# Patient Record
Sex: FEMALE | Race: WHITE | NOT HISPANIC OR LATINO | URBAN - NONMETROPOLITAN AREA
[De-identification: names, ages, dates, MRNs, and addresses within clinical notes are randomized per-mention and may not be internally consistent; named-entity substitution may affect disease eponyms.]

---

## 2021-06-28 ENCOUNTER — IMPORTED ENCOUNTER (OUTPATIENT)
Dept: URBAN - NONMETROPOLITAN AREA CLINIC 1 | Facility: CLINIC | Age: 66
End: 2021-06-28

## 2021-06-28 PROBLEM — H35.3132: Noted: 2021-06-28

## 2021-06-28 PROBLEM — H16.223: Noted: 2021-06-28

## 2021-06-28 PROBLEM — H53.2: Noted: 2021-06-28

## 2021-06-28 PROBLEM — H52.4: Noted: 2021-06-28

## 2021-06-28 PROBLEM — H25.813: Noted: 2021-06-28

## 2021-06-28 PROCEDURE — 99204 OFFICE O/P NEW MOD 45 MIN: CPT

## 2021-06-28 PROCEDURE — 92134 CPTRZ OPH DX IMG PST SGM RTA: CPT

## 2021-06-28 PROCEDURE — 92015 DETERMINE REFRACTIVE STATE: CPT

## 2021-06-28 NOTE — PATIENT DISCUSSION
AMD - dry-Explained dry AMD and advised that there are currently no treatments available. -Recommend pt continue ocuvite and monitoring Amsler grid.-Amsler grid given and explained to pt. Recommend monitoring daily. Pt is to contact our office if any changes are noted. -Order OCT MAC today performed and reviwed w/pt Cataract(s)-Visually significant.-Cataract(s) causing symptomatic impairment of visual function not correctable with a tolerable change in glasses or contact lenses lighting or non-operative means resulting in specific activity limitations and/or participation restrictions including but not limited to reading viewing television driving or meeting vocational or recreational needs. -Expectation is clearer vision and reduced glare disability after cataract removal.-discussed this may improve the double vision-Refer to Dr Luis Angel Blount for cataract evaluationKsicca Discussed w/pt START Restasis bid OU written Rx given Continue to monitorPresbyopia/Diplopia -Discussed diagnosis with patient. Updated spec Rx given.; Dr's Notes: PCP: Dr. Nate Allan at [facility]

## 2021-07-30 ENCOUNTER — IMPORTED ENCOUNTER (OUTPATIENT)
Dept: URBAN - NONMETROPOLITAN AREA CLINIC 1 | Facility: CLINIC | Age: 66
End: 2021-07-30

## 2021-07-30 PROBLEM — H35.3132: Noted: 2021-07-30

## 2021-07-30 PROBLEM — H25.813: Noted: 2021-07-30

## 2021-07-30 PROCEDURE — 92014 COMPRE OPH EXAM EST PT 1/>: CPT

## 2021-07-30 NOTE — PATIENT DISCUSSION
Cataract(s)-Visually significant cataract OU .-Cataract(s) causing symptomatic impairment of visual function not correctable with a tolerable change in glasses or contact lenses lighting or non-operative means resulting in specific activity limitations and/or participation restrictions including but not limited to reading viewing television driving or meeting vocational or recreational needs. -Expectation is clearer vision and functional improvement in symptoms as well as reduced glare disability after cataract removal.-Order IOLMaster and OPD today. -Recommend Stand/Terad  based on today's OPD testing and lifestyle questionnaire.-All questions were answered regarding surgery including pre and post-op medications appointments activity restrictions and anesthetic usage.-The risks benefits and alternatives and special risk factors for the patient were discussed in detail including but not limited to: bleeding infection retinal detachment vitreous loss problems with the implant and possible need for additional surgery.-Although rare the possibility of complete vision loss was discussed.-The possible need for glasses post-operatively was discussed.-Order medical clearance exam based on history of high cholesterol -Patient elects to proceed with cataract surgery OD . Will schedule at patient's convenience and re-evaluate OS  in the future. Discussed lens and she elects Stand/Trad OU discussed need for bifocals.  Post opinflammation anticipated discussed dextenza insertion after surgery.; Dr's Notes: PCP: Dr. Gilbert Fine at [facility]

## 2021-09-13 PROBLEM — H25.813: Noted: 2021-09-13

## 2021-09-13 PROBLEM — H35.3132: Noted: 2021-09-13

## 2021-09-15 ENCOUNTER — IMPORTED ENCOUNTER (OUTPATIENT)
Dept: URBAN - NONMETROPOLITAN AREA CLINIC 1 | Facility: CLINIC | Age: 66
End: 2021-09-15

## 2021-09-15 NOTE — PATIENT DISCUSSION
Medical Clearance-Medical clearance done today. -No outstanding concerns that would preclude surgery.-Patient is cleared to proceed with scheduled surgery.; Dr's Notes: PCP: Dr. Sarah East at [facility]

## 2021-09-16 PROBLEM — J43.9: Noted: 2021-09-16

## 2021-09-16 PROBLEM — M13.80: Noted: 2021-09-16

## 2021-09-16 PROBLEM — R06.02: Noted: 2021-09-16

## 2021-09-16 PROBLEM — E78.5: Noted: 2021-09-16

## 2021-09-16 PROBLEM — Z01.818: Noted: 2021-09-16

## 2021-09-16 PROBLEM — F41.9: Noted: 2021-09-16

## 2021-09-16 PROBLEM — K21.9: Noted: 2021-09-16

## 2021-10-01 ENCOUNTER — IMPORTED ENCOUNTER (OUTPATIENT)
Dept: URBAN - NONMETROPOLITAN AREA CLINIC 1 | Facility: CLINIC | Age: 66
End: 2021-10-01

## 2021-10-01 PROBLEM — F41.9: Noted: 2021-10-01

## 2021-10-01 PROBLEM — H35.3132: Noted: 2021-10-01

## 2021-10-01 PROBLEM — K21.9: Noted: 2021-10-01

## 2021-10-01 PROBLEM — H25.812: Noted: 2021-10-01

## 2021-10-01 PROBLEM — Z98.41: Noted: 2021-10-01

## 2021-10-01 PROBLEM — M13.80: Noted: 2021-10-01

## 2021-10-01 PROCEDURE — 99024 POSTOP FOLLOW-UP VISIT: CPT

## 2021-10-01 NOTE — PATIENT DISCUSSION
s/p PCIOL-Pt doing well s/p PCIOL. -Continue post-op gtts according to instruction sheet and sleep with eye shield over eye for 7 nights.-Avoid bending at the waist lifting anything over 5lbs and dirty or chandni environments. -RTC 1 week POVCataract OS-Re-eval with Dr. Ike Woodall on 10/4/21AMD-Will need further evaluation after surgery including MAC OCT vitamins and FORESEEHOME.; Dr's Notes: PCP: Dr. Breezy Cannon at [facility]

## 2021-10-04 ENCOUNTER — IMPORTED ENCOUNTER (OUTPATIENT)
Dept: URBAN - NONMETROPOLITAN AREA CLINIC 1 | Facility: CLINIC | Age: 66
End: 2021-10-04

## 2021-10-04 PROBLEM — H25.812: Noted: 2021-10-01

## 2021-10-04 PROBLEM — F41.9: Noted: 2021-10-01

## 2021-10-04 PROBLEM — H35.3132: Noted: 2021-10-01

## 2021-10-04 PROBLEM — Z01.818: Noted: 2021-10-04

## 2021-10-04 PROBLEM — M13.80: Noted: 2021-10-01

## 2021-10-04 PROBLEM — K21.9: Noted: 2021-10-01

## 2021-10-04 PROCEDURE — 99024 POSTOP FOLLOW-UP VISIT: CPT

## 2021-10-04 NOTE — PATIENT DISCUSSION
Medical Clearance-Medical clearance done today. -No outstanding concerns that would preclude surgery.-Patient is cleared to proceed with scheduled surgery.; Dr's Notes: PCP: Dr. Maddy Arreguin at [facility]

## 2021-10-04 NOTE — PATIENT DISCUSSION
Cataract(s)-Visually significant cataract OS . -Cataract(s) causing symptomatic impairment of visual function not correctable with a tolerable change in glasses or contact lenses lighting or non-operative means resulting in specific activity limitations and/or participation restrictions including but not limited to reading viewing television driving or meeting vocational or recreational needs. -Expectation is clearer vision and functional improvement in symptoms as well as reduced glare disability after cataract removal.-Recommend Stand/trad based on previous OPD testing and lifestyle questionnaire.-All questions were answered regarding surgery including pre and post-op medications appointments activity restrictions and anesthetic usage.-The risks benefits and alternatives and special risk factors for the patient were discussed in detail including but not limited to: bleeding infection retinal detachment vitreous loss problems with the implant and possible need for additional surgery.-Although rare the possibility of complete vision loss was discussed.-The need for glasses post-operatively was discussed.-Patient elects to proceed with cataract surgery OS . Instructed pt to use inhaler before surgerys/p PCIOL-Pt doing well at 1 week s/p PCIOL. -Continue post-op gtts according to instruction sheet.-Okay to resume usual activites and d/c eye shield.; Dr's Notes: PCP: Dr. Senait Sibley at [facility]

## 2021-10-12 ENCOUNTER — IMPORTED ENCOUNTER (OUTPATIENT)
Dept: URBAN - NONMETROPOLITAN AREA CLINIC 1 | Facility: CLINIC | Age: 66
End: 2021-10-12

## 2021-10-12 PROBLEM — Z98.41: Noted: 2021-10-12

## 2021-10-12 PROBLEM — Z98.42: Noted: 2021-10-12

## 2021-10-12 PROBLEM — H35.3132: Noted: 2021-10-01

## 2021-10-12 PROCEDURE — 99024 POSTOP FOLLOW-UP VISIT: CPT

## 2021-10-12 NOTE — PATIENT DISCUSSION
s/p PCIOL Stand/trad OS 10/11/21-Pt doing well s/p PCIOL. -Continue post-op gtts according to instruction sheet and sleep with eye shield over eye for 7 nights.-Avoid bending at the waist lifting anything over 5lbs and dirty or chandni environments.; Dr's Notes: PCP: Dr. Dixie Kaba at [facility]

## 2021-10-26 ENCOUNTER — IMPORTED ENCOUNTER (OUTPATIENT)
Dept: URBAN - NONMETROPOLITAN AREA CLINIC 1 | Facility: CLINIC | Age: 66
End: 2021-10-26

## 2021-10-26 PROBLEM — Z98.42: Noted: 2021-10-12

## 2021-10-26 PROBLEM — H16.223: Noted: 2021-10-26

## 2021-10-26 PROBLEM — Z98.41: Noted: 2021-10-12

## 2021-10-26 PROBLEM — H02.403: Noted: 2021-10-26

## 2021-10-26 PROBLEM — H35.3132: Noted: 2021-10-01

## 2021-10-26 PROCEDURE — 99024 POSTOP FOLLOW-UP VISIT: CPT

## 2021-10-26 NOTE — PATIENT DISCUSSION
s/p PCIOL-Stable PCIOL OU.-Monitor for PCO. -Finish drops as directed previously-Rx issued for new glassesAMD OU-Order MAC OCT at next visit-Consider FORESEEHOMEDES OU-Continue RESTASIS BID OU. PTOSIS-Consider UPNEEQ at next visit if doesn't improve naturally; Dr's Notes: PCP: Dr. Jed Keita at [facility]

## 2022-02-28 ENCOUNTER — ESTABLISHED PATIENT (OUTPATIENT)
Dept: RURAL CLINIC 2 | Facility: CLINIC | Age: 67
End: 2022-02-28

## 2022-02-28 DIAGNOSIS — H16.143: ICD-10-CM

## 2022-02-28 DIAGNOSIS — Z96.1: ICD-10-CM

## 2022-02-28 DIAGNOSIS — H02.413: ICD-10-CM

## 2022-02-28 DIAGNOSIS — H43.813: ICD-10-CM

## 2022-02-28 DIAGNOSIS — H35.3132: ICD-10-CM

## 2022-02-28 DIAGNOSIS — H16.223: ICD-10-CM

## 2022-02-28 DIAGNOSIS — H26.493: ICD-10-CM

## 2022-02-28 PROCEDURE — 92134 CPTRZ OPH DX IMG PST SGM RTA: CPT

## 2022-02-28 PROCEDURE — 92014 COMPRE OPH EXAM EST PT 1/>: CPT

## 2022-02-28 ASSESSMENT — VISUAL ACUITY
OD_SC: 20/25
OS_SC: 20/30

## 2022-02-28 ASSESSMENT — TONOMETRY
OD_IOP_MMHG: 14
OS_IOP_MMHG: 12

## 2022-02-28 NOTE — PATIENT DISCUSSION
Importance of daily AREDS II study multivitamin and Amsler Grid checks discussed with patient. Patient to follow-up immediately with any new onset of decreased vision and/or metamorphopsia.  Consider Kristin Adorno in future.

## 2022-04-15 ASSESSMENT — VISUAL ACUITY
OS_SC: 20/40-2
OS_PH: 20/40
OS_CC: 20/200
OS_CC: 20/200
OD_CC: 20/70+1
OS_GLARE: 20/100
OD_PH: 20/40
OD_CC: 20/50
OS_GLARE: 20/100
OS_PH: 20/70
OS_CC: J1
OD_PAM: 20/20
OS_CC: 20/30-
OS_CC: 20/50
OD_SC: 20/40-2
OD_PH: 20/40
OS_SC: 20/50
OS_GLARE: 20/200
OD_SC: 20/200
OD_CC: 20/20
OD_CC: 20/50
OS_AM: 20/20
OD_CC: 20/200
OD_CC: J1
OD_GLARE: 20/80
OS_SC: 20/30
OS_CC: 20/200+1
OD_CC: 20/60+
OS_CC: 20/20
OD_GLARE: 20/100
OS_PH: 20/40
OD_PH: 20/40

## 2022-04-15 ASSESSMENT — TONOMETRY
OS_IOP_MMHG: 16
OD_IOP_MMHG: 17
OS_IOP_MMHG: 16
OD_IOP_MMHG: 17
OD_IOP_MMHG: 14
OD_IOP_MMHG: 17
OS_IOP_MMHG: 17
OD_IOP_MMHG: 16
OD_IOP_MMHG: 15
OS_IOP_MMHG: 17
OS_IOP_MMHG: 15
OS_IOP_MMHG: 17

## 2024-02-26 ENCOUNTER — ESTABLISHED PATIENT (OUTPATIENT)
Dept: RURAL CLINIC 2 | Facility: CLINIC | Age: 69
End: 2024-02-26

## 2024-02-26 DIAGNOSIS — Z96.1: ICD-10-CM

## 2024-02-26 DIAGNOSIS — H35.3132: ICD-10-CM

## 2024-02-26 DIAGNOSIS — H52.4: ICD-10-CM

## 2024-02-26 DIAGNOSIS — H43.813: ICD-10-CM

## 2024-02-26 DIAGNOSIS — H26.493: ICD-10-CM

## 2024-02-26 DIAGNOSIS — H52.13: ICD-10-CM

## 2024-02-26 DIAGNOSIS — H16.223: ICD-10-CM

## 2024-02-26 DIAGNOSIS — H52.223: ICD-10-CM

## 2024-02-26 PROCEDURE — 92015 DETERMINE REFRACTIVE STATE: CPT

## 2024-02-26 PROCEDURE — 92014 COMPRE OPH EXAM EST PT 1/>: CPT

## 2024-02-26 ASSESSMENT — TONOMETRY
OS_IOP_MMHG: 15
OD_IOP_MMHG: 15

## 2024-02-26 ASSESSMENT — VISUAL ACUITY
OD_CC: 20/40-1
OS_CC: 20/30